# Patient Record
Sex: FEMALE | URBAN - METROPOLITAN AREA
[De-identification: names, ages, dates, MRNs, and addresses within clinical notes are randomized per-mention and may not be internally consistent; named-entity substitution may affect disease eponyms.]

---

## 2021-12-06 ENCOUNTER — IMMUNIZATION (OUTPATIENT)
Dept: NURSING | Facility: CLINIC | Age: 41
End: 2021-12-06
Payer: COMMERCIAL

## 2021-12-06 PROCEDURE — 90471 IMMUNIZATION ADMIN: CPT

## 2021-12-06 PROCEDURE — 90686 IIV4 VACC NO PRSV 0.5 ML IM: CPT

## 2021-12-06 PROCEDURE — 91306 COVID-19,PF,MODERNA (18+ YRS BOOSTER .25ML): CPT

## 2021-12-06 PROCEDURE — 0064A COVID-19,PF,MODERNA (18+ YRS BOOSTER .25ML): CPT

## 2022-02-03 ENCOUNTER — LAB (OUTPATIENT)
Dept: LAB | Facility: CLINIC | Age: 42
End: 2022-02-03
Payer: COMMERCIAL

## 2022-02-03 ENCOUNTER — OFFICE VISIT (OUTPATIENT)
Dept: INTERNAL MEDICINE | Facility: CLINIC | Age: 42
End: 2022-02-03
Payer: COMMERCIAL

## 2022-02-03 VITALS
OXYGEN SATURATION: 99 % | SYSTOLIC BLOOD PRESSURE: 108 MMHG | DIASTOLIC BLOOD PRESSURE: 76 MMHG | HEIGHT: 62 IN | WEIGHT: 102 LBS | RESPIRATION RATE: 16 BRPM | BODY MASS INDEX: 18.77 KG/M2 | HEART RATE: 69 BPM

## 2022-02-03 DIAGNOSIS — Z00.00 ROUTINE HISTORY AND PHYSICAL EXAMINATION OF ADULT: ICD-10-CM

## 2022-02-03 DIAGNOSIS — H74.91 DISORDER OF RIGHT MIDDLE EAR: Primary | ICD-10-CM

## 2022-02-03 LAB
ERYTHROCYTE [DISTWIDTH] IN BLOOD BY AUTOMATED COUNT: 18.1 % (ref 10–15)
FERRITIN SERPL-MCNC: 4 NG/ML (ref 12–150)
HCT VFR BLD AUTO: 34.8 % (ref 35–47)
HGB BLD-MCNC: 10.8 G/DL (ref 11.7–15.7)
HIV 1+2 AB+HIV1 P24 AG SERPL QL IA: NONREACTIVE
IRON SATN MFR SERPL: 8 % (ref 15–46)
IRON SERPL-MCNC: 34 UG/DL (ref 35–180)
MCH RBC QN AUTO: 23.8 PG (ref 26.5–33)
MCHC RBC AUTO-ENTMCNC: 31 G/DL (ref 31.5–36.5)
MCV RBC AUTO: 77 FL (ref 78–100)
PLATELET # BLD AUTO: 404 10E3/UL (ref 150–450)
RBC # BLD AUTO: 4.53 10E6/UL (ref 3.8–5.2)
T PALLIDUM AB SER QL: NONREACTIVE
TIBC SERPL-MCNC: 422 UG/DL (ref 240–430)
TSH SERPL DL<=0.005 MIU/L-ACNC: 2.56 MU/L (ref 0.4–4)
WBC # BLD AUTO: 5.6 10E3/UL (ref 4–11)

## 2022-02-03 PROCEDURE — 99386 PREV VISIT NEW AGE 40-64: CPT | Mod: 25 | Performed by: STUDENT IN AN ORGANIZED HEALTH CARE EDUCATION/TRAINING PROGRAM

## 2022-02-03 PROCEDURE — 82728 ASSAY OF FERRITIN: CPT | Performed by: PATHOLOGY

## 2022-02-03 PROCEDURE — 90471 IMMUNIZATION ADMIN: CPT | Performed by: STUDENT IN AN ORGANIZED HEALTH CARE EDUCATION/TRAINING PROGRAM

## 2022-02-03 PROCEDURE — 90715 TDAP VACCINE 7 YRS/> IM: CPT | Performed by: STUDENT IN AN ORGANIZED HEALTH CARE EDUCATION/TRAINING PROGRAM

## 2022-02-03 PROCEDURE — 87491 CHLMYD TRACH DNA AMP PROBE: CPT | Mod: 90 | Performed by: PATHOLOGY

## 2022-02-03 PROCEDURE — 87591 N.GONORRHOEAE DNA AMP PROB: CPT | Mod: 90 | Performed by: PATHOLOGY

## 2022-02-03 PROCEDURE — 87389 HIV-1 AG W/HIV-1&-2 AB AG IA: CPT | Mod: 90 | Performed by: PATHOLOGY

## 2022-02-03 PROCEDURE — 86780 TREPONEMA PALLIDUM: CPT | Mod: 90 | Performed by: PATHOLOGY

## 2022-02-03 PROCEDURE — 85027 COMPLETE CBC AUTOMATED: CPT | Performed by: PATHOLOGY

## 2022-02-03 PROCEDURE — 36415 COLL VENOUS BLD VENIPUNCTURE: CPT | Performed by: PATHOLOGY

## 2022-02-03 PROCEDURE — 83550 IRON BINDING TEST: CPT | Performed by: PATHOLOGY

## 2022-02-03 PROCEDURE — 99000 SPECIMEN HANDLING OFFICE-LAB: CPT | Performed by: PATHOLOGY

## 2022-02-03 PROCEDURE — 84443 ASSAY THYROID STIM HORMONE: CPT | Performed by: PATHOLOGY

## 2022-02-03 RX ORDER — LORATADINE 10 MG/1
10 TABLET ORAL
COMMUNITY
Start: 2021-08-27

## 2022-02-03 RX ORDER — SERTRALINE HYDROCHLORIDE 100 MG/1
100 TABLET, FILM COATED ORAL
COMMUNITY
Start: 2021-08-27 | End: 2022-10-11

## 2022-02-03 ASSESSMENT — MIFFLIN-ST. JEOR: SCORE: 1076.95

## 2022-02-03 ASSESSMENT — PAIN SCALES - GENERAL: PAINLEVEL: NO PAIN (0)

## 2022-02-03 NOTE — TELEPHONE ENCOUNTER
FUTURE VISIT INFORMATION      FUTURE VISIT INFORMATION:    Date: 3/10/22    Time: 9AM    Location: [facility]  REFERRAL INFORMATION:    Referring provider:  Tiffanie Jewell MD    Referring providers clinic:  [facility]     Reason for visit/diagnosis  Whooshing sounds- Refered by Geeta Clayton MD in [facility]    RECORDS REQUESTED FROM:       Clinic name Comments Records Status Imaging Status     [facility]  2/3/22 note from Tiffanie Jewell MD   Epic

## 2022-02-03 NOTE — PROGRESS NOTES
"CC: est care      HPI:  41F with no significant PMH presents today to establish care.    Has had \"wooshing\" sounds in R ear for a while now. Had sinus surgery 11 years ago - L maxillary sinus debridement for sinus infection. Wants to see ENT. No hearing loss, tinnitus, ear problems/surgery in past, recent ear infections.    Works as professor. Healthy, no specific complaints. No health conditions run in family except for NILTON and thyroid disorders so wants to be checked for this.    ROS:  10pt ROS reviewed and positive per HPI.    RHM:  Vaccinations:   COVID-19:3 doses   Influenza: done   Tetanus (1 Tdap then td/tdap q10y): unsure when last due  Screening   Pap (21-65 yrs q3 or 5y): been less than 5 years at [place].   Sexually active - with long term partner, wants STI testing  Works as professor  Lifestyle: tobacco use: never. Alcohol use: 6/week. Recreational drug use: denies.  Family - maternal great aunt w/ ovarian. No breast cancer. No colon cancer.    No past medical history on file.  No past surgical history on file.  No family history on file.  Social History     Tobacco Use     Smoking status: Not on file     Smokeless tobacco: Not on file   Substance Use Topics     Alcohol use: Not on file     Drug use: Not on file     Current Outpatient Medications   Medication Sig Dispense Refill     loratadine (CLARITIN) 10 MG tablet Take 10 mg by mouth       sertraline (ZOLOFT) 100 MG tablet Take 100 mg by mouth          Allergies   Allergen Reactions     Pollen Extract          /76 (BP Location: Right arm, Patient Position: Sitting, Cuff Size: Adult Regular)   Pulse 69   Resp 16   Ht 1.568 m (5' 1.75\")   Wt 46.3 kg (102 lb)   SpO2 99%   BMI 18.81 kg/m    Physical Examination:    General:  Conversant, generally healthy appearing, no acute distress  Head: atraumatic  Ears:  TM's normal, EAC's patent, clear of cerumen  Neck:  Trachea midline, Full AROM, supple, thyroid smooth, symmetric, not enlarged, no " nodules, no neck lymphadenopathy  Lungs:  CTAB. Normal respiratory effort on room air.  C/V:  RRR with no m/r/g.    Neuro: Alert and oriented, face symmetric. Able to get on/off exam table without assistance.   Skin:   warm, dry, no rashes on exposed skin surfaces  Extremities:  No peripheral edema, no digital cyanosis  Psych:  Alert and oriented. Appropriate affect.        A&P:  RHM  -sti screening today  -tdap  -request records for last pap smear  -given family history check cbc, iron, tsh    Middle ear problem  Exam grossly normal but with persistent sound in ear. No hearing loss or tinnitus or ear infection.  -ENT referral      Destini Jewell MD  IM Resident PGY-2  Pager [phone]    This patient was discussed with Dr. Clayton.

## 2022-02-04 LAB
C TRACH DNA SPEC QL NAA+PROBE: NEGATIVE
N GONORRHOEA DNA SPEC QL NAA+PROBE: NEGATIVE

## 2022-02-17 DIAGNOSIS — Z01.10 ENCOUNTER FOR HEARING TEST: Primary | ICD-10-CM

## 2022-03-10 ENCOUNTER — PRE VISIT (OUTPATIENT)
Dept: OTOLARYNGOLOGY | Facility: CLINIC | Age: 42
End: 2022-03-10

## 2022-03-10 ENCOUNTER — OFFICE VISIT (OUTPATIENT)
Dept: OTOLARYNGOLOGY | Facility: CLINIC | Age: 42
End: 2022-03-10
Payer: COMMERCIAL

## 2022-03-10 ENCOUNTER — OFFICE VISIT (OUTPATIENT)
Dept: AUDIOLOGY | Facility: CLINIC | Age: 42
End: 2022-03-10
Payer: COMMERCIAL

## 2022-03-10 VITALS
OXYGEN SATURATION: 98 % | WEIGHT: 102 LBS | TEMPERATURE: 97.9 F | DIASTOLIC BLOOD PRESSURE: 45 MMHG | SYSTOLIC BLOOD PRESSURE: 78 MMHG | HEIGHT: 62 IN | HEART RATE: 75 BPM | BODY MASS INDEX: 18.77 KG/M2

## 2022-03-10 DIAGNOSIS — H93.A1 PULSATILE TINNITUS OF RIGHT EAR: ICD-10-CM

## 2022-03-10 DIAGNOSIS — H93.11 TINNITUS OF RIGHT EAR: Primary | ICD-10-CM

## 2022-03-10 DIAGNOSIS — Z01.10 ENCOUNTER FOR HEARING TEST: ICD-10-CM

## 2022-03-10 DIAGNOSIS — H93.11 TINNITUS, RIGHT: Primary | ICD-10-CM

## 2022-03-10 PROCEDURE — 99214 OFFICE O/P EST MOD 30 MIN: CPT | Performed by: REGISTERED NURSE

## 2022-03-10 PROCEDURE — 92550 TYMPANOMETRY & REFLEX THRESH: CPT | Performed by: AUDIOLOGIST

## 2022-03-10 PROCEDURE — 92557 COMPREHENSIVE HEARING TEST: CPT | Performed by: AUDIOLOGIST

## 2022-03-10 ASSESSMENT — PAIN SCALES - GENERAL: PAINLEVEL: NO PAIN (0)

## 2022-03-10 NOTE — PATIENT INSTRUCTIONS
- You were seen in the ENT Clinic today by Josee Ba NP.   - Please schedule MRI and MRA at your convenience. Clinic will call with results once available.  - Please send a [email or web address] message or call the ENT clinic at [phone] with any questions or concerns.

## 2022-03-10 NOTE — PROGRESS NOTES
AUDIOLOGY REPORT     SUMMARY: Audiology visit completed. See audiogram for results.       RECOMMENDATIONS: Follow-up with ENT.     Radha Zavaleta CCC-A  Licensed Audiologist   [place] #[id number]

## 2022-03-10 NOTE — NURSING NOTE
"Chief Complaint   Patient presents with     Consult     New patient       Blood pressure (!) 78/45, pulse 75, temperature 97.9  F (36.6  C), temperature source Temporal, height 1.575 m (5' 2\"), weight 46.3 kg (102 lb), SpO2 98 %.    Channing Johnston, EMT  "

## 2022-03-10 NOTE — PROGRESS NOTES
Otolaryngology Clinic  March 10, 2022    Chief Complaint:   Right tinnitus     History of Present Illness:   Olesya Hillman is a 41 year old female who presents today for evaluation of right-sided tinnitus.  Patient describes an acute onset beginning a couple months ago of unilateral symptoms of tinnitus and whooshing sound on the right side.  The symptoms are intermittent and can change throughout the day.  Patient describes that most consistently tinnitus is a pulsating lower tone.  Does not seem to change with position or turning of head but just occurs randomly throughout the day.  Typically does not last the whole day.  This is sometimes accompanied with aural fullness.  Patient denies any change in hearing.     Patient denies any otalgia, otorrhea, dizziness, facial numbness/weakness, blurred vision, history of frequent ear infections, or ear surgeries.  Denies history of head or noise trauma.  Grandparents with hearing loss in their late 70s.  Patient denies any TMJ symptoms, however, does report frequently chewing on ice.    Past Medical History:  No past medical history on file.    Past Surgical History:  No past surgical history on file.    Medications:  Current Outpatient Medications   Medication Sig Dispense Refill     loratadine (CLARITIN) 10 MG tablet Take 10 mg by mouth       sertraline (ZOLOFT) 100 MG tablet Take 100 mg by mouth         Allergies:  Allergies   Allergen Reactions     Pollen Extract         Social History:  Social History     Tobacco Use     Smoking status: Not on file     Smokeless tobacco: Not on file   Substance Use Topics     Alcohol use: Not on file     Drug use: Not on file       ROS: 10 point ROS neg other than the symptoms noted above in the HPI.    Physical Exam:    There were no vitals taken for this visit.     Constitutional:  The patient was unaccompanied, well-groomed, and in no acute distress.     Neurologic: Alert and oriented x 3.  CN's III-XII within normal  limits.  Voice normal.   Psychiatric: The patient's affect was calm, cooperative, and appropriate.     Communication:  Normal; communicates verbally, normal voice quality.    Respiratory: Breathing comfortably without stridor or exertion of accessory muscles.    Ears: Pinnae and tragus non-tender.  EAC's and TM's were clear.    Oral Cavity: No tenderness with palpation of temporomandibular joints.      Audiogram: 3/10/2022- data independently reviewed  Normal hearing bilaterallly    WR right: 96% left: 96% @ 60 dB  Acoustic Reflexes: Present in left ipsi and right contra. Absent in right ipsi and left contra.  Tympanograms: type A bilaterally            Assessment and Plan:  1.  Pulsatile tinnitus of right ear  Patient with 3-month history of intermittent right pulsatile tinnitus.  Reviewed audiogram with patient today that shows bilateral normal hearing.  Ear exam is normal on today's exam.  Patient is neurologically intact.  Due to unilateral symptoms, will order MRI and MRA to further evaluate.  If these are negative, I suspect symptoms are likely due to inflammation of the TMJ that could be exacerbated by ice chewing.  Recommend patient refrain from ice to see if this improves symptoms.    We will contact patient with results and follow-up plan of care once available.     Josee Ba DNP, APRN, CNP  Otolaryngology  Head & Neck Surgery  [phone]    30 minutes spent on the date of the encounter doing chart review, history and exam, documentation and further activities.

## 2022-03-10 NOTE — LETTER
3/10/2022       RE: Olesya Hillman  [address] [facility] [address]     Dear Colleague,    Thank you for referring your patient, Olesya Hillman, to the [facility] [place] [facility]. Please see a copy of my visit note below.      Otolaryngology Clinic  March 10, 2022    Chief Complaint:   Right tinnitus     History of Present Illness:   Olesya Hillman is a 41 year old female who presents today for evaluation of right-sided tinnitus.  Patient describes an acute onset beginning a couple months ago of unilateral symptoms of tinnitus and whooshing sound on the right side.  The symptoms are intermittent and can change throughout the day.  Patient describes that most consistently tinnitus is a pulsating lower tone.  Does not seem to change with position or turning of head but just occurs randomly throughout the day.  Typically does not last the whole day.  This is sometimes accompanied with aural fullness.  Patient denies any change in hearing.     Patient denies any otalgia, otorrhea, dizziness, facial numbness/weakness, blurred vision, history of frequent ear infections, or ear surgeries.  Denies history of head or noise trauma.  Grandparents with hearing loss in their late 70s.  Patient denies any TMJ symptoms, however, does report frequently chewing on ice.    Past Medical History:  No past medical history on file.    Past Surgical History:  No past surgical history on file.    Medications:  Current Outpatient Medications   Medication Sig Dispense Refill     loratadine (CLARITIN) 10 MG tablet Take 10 mg by mouth       sertraline (ZOLOFT) 100 MG tablet Take 100 mg by mouth         Allergies:  Allergies   Allergen Reactions     Pollen Extract         Social History:  Social History     Tobacco Use     Smoking status: Not on file     Smokeless tobacco: Not on file   Substance Use Topics     Alcohol use: Not on  file     Drug use: Not on file       ROS: 10 point ROS neg other than the symptoms noted above in the HPI.    Physical Exam:    There were no vitals taken for this visit.     Constitutional:  The patient was unaccompanied, well-groomed, and in no acute distress.     Neurologic: Alert and oriented x 3.  CN's III-XII within normal limits.  Voice normal.   Psychiatric: The patient's affect was calm, cooperative, and appropriate.     Communication:  Normal; communicates verbally, normal voice quality.    Respiratory: Breathing comfortably without stridor or exertion of accessory muscles.    Ears: Pinnae and tragus non-tender.  EAC's and TM's were clear.    Oral Cavity: No tenderness with palpation of temporomandibular joints.      Audiogram: 3/10/2022- data independently reviewed  Normal hearing bilaterallly    WR right: 96% left: 96% @ 60 dB  Acoustic Reflexes: Present in left ipsi and right contra. Absent in right ipsi and left contra.  Tympanograms: type A bilaterally            Assessment and Plan:  1.  Pulsatile tinnitus of right ear  Patient with 3-month history of intermittent right pulsatile tinnitus.  Reviewed audiogram with patient today that shows bilateral normal hearing.  Ear exam is normal on today's exam.  Patient is neurologically intact.  Due to unilateral symptoms, will order MRI and MRA to further evaluate.  If these are negative, I suspect symptoms are likely due to inflammation of the TMJ that could be exacerbated by ice chewing.  Recommend patient refrain from ice to see if this improves symptoms.    We will contact patient with results and follow-up plan of care once available.     Josee Ba DNP, APRN, CNP  Otolaryngology  Head & Neck Surgery  [phone]    30 minutes spent on the date of the encounter doing chart review, history and exam, documentation and further activities.      Again, thank you for allowing me to participate in the care of your patient.      Sincerely,    Celia PARDO  LALA Ba

## 2022-03-24 ENCOUNTER — ANCILLARY PROCEDURE (OUTPATIENT)
Dept: MRI IMAGING | Facility: CLINIC | Age: 42
End: 2022-03-24
Attending: REGISTERED NURSE
Payer: COMMERCIAL

## 2022-03-24 DIAGNOSIS — H93.A1 PULSATILE TINNITUS OF RIGHT EAR: ICD-10-CM

## 2022-03-24 DIAGNOSIS — H93.11 TINNITUS, RIGHT: ICD-10-CM

## 2022-03-24 PROCEDURE — 70553 MRI BRAIN STEM W/O & W/DYE: CPT | Mod: GC | Performed by: RADIOLOGY

## 2022-03-24 PROCEDURE — A9585 GADOBUTROL INJECTION: HCPCS | Performed by: RADIOLOGY

## 2022-03-24 PROCEDURE — 99207 MRA BRAIN (CIRCLE OF WILLIS) WO CONTRAST: CPT | Mod: GC | Performed by: RADIOLOGY

## 2022-03-24 RX ORDER — GADOBUTROL 604.72 MG/ML
7.5 INJECTION INTRAVENOUS ONCE
Status: COMPLETED | OUTPATIENT
Start: 2022-03-24 | End: 2022-03-24

## 2022-03-24 RX ADMIN — GADOBUTROL 5 ML: 604.72 INJECTION INTRAVENOUS at 17:54

## 2022-03-24 NOTE — DISCHARGE INSTRUCTIONS
MRI Contrast Discharge Instructions    The IV contrast you received today will pass out of your body in your  urine. This will happen in the next 24 hours. You will not feel this process.  Your urine will not change color.    Drink at least 4 extra glasses of water or juice today (unless your doctor  has restricted your fluids). This reduces the stress on your kidneys.  You may take your regular medicines.    If you are on dialysis: It is best to have dialysis today.    If you have a reaction: Most reactions happen right away. If you have  any new symptoms after leaving the hospital (such as hives or swelling),  call your hospital at the correct number below. Or call your family doctor.  If you have breathing distress or wheezing, call 911.    Special instructions: ***    I have read and understand the above information.    Signature:______________________________________ Date:___________    Staff:__________________________________________ Date:___________     Time:__________    [place] Radiology Departments:    ___[place]: [phone]  ___[facility]: [phone]  ___[place]: [phone] ___[facility]: [phone]  ___[facility]: [phone]  ___[facility]: [phone]  ___[phone]  ___[facility]: [phone]  ___[phone]

## 2022-03-25 ENCOUNTER — TELEPHONE (OUTPATIENT)
Dept: OTOLARYNGOLOGY | Facility: CLINIC | Age: 42
End: 2022-03-25
Payer: COMMERCIAL

## 2022-03-25 NOTE — TELEPHONE ENCOUNTER
"----- Message from Celia Ba NP sent at 3/25/2022  8:49 AM CDT -----  \"Can you please let patient know that MRI and MRA imaging did not show any concerning findings to be a cause for patient's symptoms. Both exams were normal. Patient should continue to monitor symptoms and follow TMJ management including refraining from chewing on hard/crunchy items such as ice. Follow up as needed.\" per Josee Ba    Pt given message, no further questions.    Mamta Shahid LPN      "

## 2022-07-25 ENCOUNTER — TELEPHONE (OUTPATIENT)
Dept: INTERNAL MEDICINE | Facility: CLINIC | Age: 42
End: 2022-07-25

## 2022-07-25 NOTE — TELEPHONE ENCOUNTER
[facility] Call Center    Phone Message    May a detailed message be left on voicemail: yes     Reason for Call: Other: Patient was referred on 02/03/2022 to ENT for right ear concerns. Patient saw Celia Ba NP on 3/10/2022 at ENT clinic. Patient called back to schedule with them since her ear is now totally clogged from COVID. Patient was told she needs a referral. Patient is confused since she was already referred to see ENT and saw them. Patient would prefer to see an MD instead of a NP. For a normal ENT appt cannot be seen until October. Wondering if referral can be marked ASAP.        Please call back to discuss.    Action Taken: Message routed to:  [facility] ([facility]    Travel Screening: Not Applicable

## 2022-07-26 NOTE — TELEPHONE ENCOUNTER
Reaching out to ENT clinic to clarify.    Turner Coyle CMA ([facility]) at 10:55 AM on 7/26/2022

## 2022-07-26 NOTE — TELEPHONE ENCOUNTER
HELEN with message from clinic    Patient was referred to ENT back in February. We used that referral to to see Josee. Patient cannot use that same referral to see another provider within clinic. [facility] also does not do in house second opinions for same diagnosis. Patient can see Josee again for a follow up or they can see another ENT clinic since they do not require a referral. Gave call center number

## 2022-08-01 ENCOUNTER — TELEPHONE (OUTPATIENT)
Dept: OBGYN | Facility: CLINIC | Age: 42
End: 2022-08-01

## 2022-08-01 NOTE — TELEPHONE ENCOUNTER
Spoke with patient and scheduled for appointment with Dr. Pinto for ear and cough issues post covid. Patient also scheduled annual/pap.    Nina Ny

## 2022-08-16 ENCOUNTER — OFFICE VISIT (OUTPATIENT)
Dept: FAMILY MEDICINE | Facility: CLINIC | Age: 42
End: 2022-08-16
Attending: FAMILY MEDICINE
Payer: COMMERCIAL

## 2022-08-16 VITALS
HEART RATE: 69 BPM | SYSTOLIC BLOOD PRESSURE: 109 MMHG | WEIGHT: 104 LBS | DIASTOLIC BLOOD PRESSURE: 72 MMHG | BODY MASS INDEX: 19.14 KG/M2 | HEIGHT: 62 IN

## 2022-08-16 DIAGNOSIS — Z86.16 HISTORY OF COVID-19: Primary | ICD-10-CM

## 2022-08-16 DIAGNOSIS — D50.8 IRON DEFICIENCY ANEMIA SECONDARY TO INADEQUATE DIETARY IRON INTAKE: ICD-10-CM

## 2022-08-16 DIAGNOSIS — M26.609 TMJ (TEMPOROMANDIBULAR JOINT SYNDROME): ICD-10-CM

## 2022-08-16 DIAGNOSIS — H93.8X1 EAR FULLNESS, RIGHT: ICD-10-CM

## 2022-08-16 PROCEDURE — 99203 OFFICE O/P NEW LOW 30 MIN: CPT | Performed by: FAMILY MEDICINE

## 2022-08-16 ASSESSMENT — ANXIETY QUESTIONNAIRES
5. BEING SO RESTLESS THAT IT IS HARD TO SIT STILL: NOT AT ALL
IF YOU CHECKED OFF ANY PROBLEMS ON THIS QUESTIONNAIRE, HOW DIFFICULT HAVE THESE PROBLEMS MADE IT FOR YOU TO DO YOUR WORK, TAKE CARE OF THINGS AT HOME, OR GET ALONG WITH OTHER PEOPLE: NOT DIFFICULT AT ALL
GAD7 TOTAL SCORE: 2
7. FEELING AFRAID AS IF SOMETHING AWFUL MIGHT HAPPEN: NOT AT ALL
3. WORRYING TOO MUCH ABOUT DIFFERENT THINGS: NOT AT ALL
1. FEELING NERVOUS, ANXIOUS, OR ON EDGE: SEVERAL DAYS
2. NOT BEING ABLE TO STOP OR CONTROL WORRYING: SEVERAL DAYS
6. BECOMING EASILY ANNOYED OR IRRITABLE: NOT AT ALL
GAD7 TOTAL SCORE: 2

## 2022-08-16 ASSESSMENT — PAIN SCALES - GENERAL: PAINLEVEL: NO PAIN (0)

## 2022-08-16 ASSESSMENT — PATIENT HEALTH QUESTIONNAIRE - PHQ9
SUM OF ALL RESPONSES TO PHQ QUESTIONS 1-9: 3
5. POOR APPETITE OR OVEREATING: NOT AT ALL

## 2022-08-16 NOTE — PATIENT INSTRUCTIONS
I would recommend starting ferrous sulfate 325 mg (available over the counter) every other day (iron should especially be taken separately from calcium-containing foods and beverages (milk), calcium supplements, cereals, dietary fiber, tea, coffee, and eggs). Side effects may include metallic taste, nausea, flatulence, constipation, diarrhea. Let me know if you have any questions or concerns.        Recheck iron levels in 4-8 weeks - lab orders are in

## 2022-08-16 NOTE — PROGRESS NOTES
CC: Covid Concern (Long symptoms )      ASSESSMENT/PLAN:   Olesya was seen today for covid concern.    History of COVID-19  Right ear fullness  TMJ (temporomandibular joint syndrome)  Ear fullness likely multifactorial, related to Covid-19 as she had sx after both times she had Covid, but also some TMJ symptoms as well. Recommend est care w/ dentist for TMJ evaluation.    Iron deficiency anemia secondary to inadequate dietary iron intake  Reviewed labs from 2/2022 which showed iron deficiency anemia, she was not aware of these results. Does not have heavy menses and no obvious GI source of blood loss, does admit that her diet could be more iron-rich. Will start iron supplement, risks/benefits/side effects reviewed. Recheck labs in 1-2 months.   -     Ferritin; Future  -     CBC with platelets; Future      Worrisome signs and symptoms were discussed with Olesya and she was instructed to return to the clinic for concerning symptoms or to call with questions.  Return in about 2 months (around 10/16/2022).    Kate Pinto MD  Family Medicine      SUBJECTIVE: Olesya Hillman is a 42 year old female who comes in with the following concerns:    Tested positive for Covid-19 at the end of December 2021 for the first time. A month or so later, noticed fullness in her right ear. Sleeps with ear plugs and could notice a whooshing noise. Went and saw ENT and had audiology evaluation, and everything came back normal, and symptoms dissipated. MRA brain normal. Symptoms stopped in May.     Tested positive for Covid-19 again in mid-July, and symptoms came back. Fullness in right ear. Not all the time. Sometimes notices a clicking sound when she chews. Hearing is the same and not diminished. Whooshing, fullness, clicking in her jaw with movement. No drainage from the ear. No sore throat.    Covid-19 symptoms included nasal congestion.     Unrelated, she chews a lot of ice over the last 5 years or so. She has had iron deficiency  "screening in the past and she thinks it was normal.      PMH: No medical problems.   PSH: Left maxillary sinus surgery about 10 years ago.   SH: Nonsmoker.      Menses - 3 days of bleeding, never super heavy. Every 27 days.   Normal healthy diet but doesn't cook meat.    Iron deficiency runs in her family.    She had an anal fissure a few years ago, resolved in 2021. Now has normal BMs, no constipation, no blood in stool, no black stools.     She hasn't seen the dentist in >1 yr.       OBJECTIVE:   /72   Pulse 69   Ht 1.562 m (5' 1.5\")   Wt 47.2 kg (104 lb)   BMI 19.33 kg/m    General: Alert and oriented in no acute distress.  Skin: Clear without lesions or rashes.   Lymph: No anterior cervical lymphadenopathy.   Eyes: PERRL. EOMI.   ENT: TMs intact and pearly gray. Oropharynx moist without lesions or exudate. Supple neck.  Psych: Mood and affect appropriate.      Component      Latest Ref Rng & Units 2/3/2022   WBC      4.0 - 11.0 10e3/uL 5.6   RBC Count      3.80 - 5.20 10e6/uL 4.53   Hemoglobin      11.7 - 15.7 g/dL 10.8 (L)   Hematocrit      35.0 - 47.0 % 34.8 (L)   MCV      78 - 100 fL 77 (L)   MCH      26.5 - 33.0 pg 23.8 (L)   MCHC      31.5 - 36.5 g/dL 31.0 (L)   RDW      10.0 - 15.0 % 18.1 (H)   Platelet Count      150 - 450 10e3/uL 404   Iron      35 - 180 ug/dL 34 (L)   Iron Binding Cap      240 - 430 ug/dL 422   Iron Saturation Index      15 - 46 % 8 (L)   Ferritin      12 - 150 ng/mL 4 (L)     "

## 2022-09-07 ENCOUNTER — MYC MEDICAL ADVICE (OUTPATIENT)
Dept: FAMILY MEDICINE | Facility: CLINIC | Age: 42
End: 2022-09-07

## 2022-09-07 DIAGNOSIS — Z86.16 HISTORY OF COVID-19: Primary | ICD-10-CM

## 2022-09-07 DIAGNOSIS — H93.8X1 EAR FULLNESS, RIGHT: ICD-10-CM

## 2022-09-13 ENCOUNTER — OFFICE VISIT (OUTPATIENT)
Dept: OBGYN | Facility: CLINIC | Age: 42
End: 2022-09-13
Payer: COMMERCIAL

## 2022-09-13 VITALS
DIASTOLIC BLOOD PRESSURE: 79 MMHG | BODY MASS INDEX: 19.05 KG/M2 | HEART RATE: 71 BPM | HEIGHT: 62 IN | SYSTOLIC BLOOD PRESSURE: 123 MMHG | WEIGHT: 103.5 LBS

## 2022-09-13 DIAGNOSIS — Z12.4 CERVICAL CANCER SCREENING: ICD-10-CM

## 2022-09-13 DIAGNOSIS — Z00.00 ANNUAL PHYSICAL EXAM: Primary | ICD-10-CM

## 2022-09-13 DIAGNOSIS — F41.1 GAD (GENERALIZED ANXIETY DISORDER): ICD-10-CM

## 2022-09-13 PROCEDURE — G0463 HOSPITAL OUTPT CLINIC VISIT: HCPCS | Mod: 25

## 2022-09-13 PROCEDURE — G0145 SCR C/V CYTO,THINLAYER,RESCR: HCPCS | Performed by: OBSTETRICS & GYNECOLOGY

## 2022-09-13 PROCEDURE — 99386 PREV VISIT NEW AGE 40-64: CPT | Mod: GE | Performed by: OBSTETRICS & GYNECOLOGY

## 2022-09-13 PROCEDURE — 87624 HPV HI-RISK TYP POOLED RSLT: CPT | Performed by: OBSTETRICS & GYNECOLOGY

## 2022-09-13 RX ORDER — SERTRALINE HYDROCHLORIDE 100 MG/1
100 TABLET, FILM COATED ORAL DAILY
Qty: 30 TABLET | Refills: 0 | Status: SHIPPED | OUTPATIENT
Start: 2022-09-13 | End: 2022-10-11

## 2022-09-13 NOTE — PROGRESS NOTES
[facility] Annual Exam Note    CC: Annual exam    HPI: 42 year old G0 who presents to clinic for an annual exam. She is overall doing well, no concerns today. She has a history of pelvic pain, vulvodynia. She had a vestibulectomy in , with resolution of her symptoms. She has regular monthly periods, no heavy bleeding or cramping. Using condoms for contraception. She from cervix a few years ago, has 17 eggs frozen at [facility]. No abdominal pain, CP, SOB, dizziness, lightheadedness, fevers, chills, urinary symptoms, bowel symptoms.    OBHx:   OB History    Para Term  AB Living   0 0 0 0 0 0   SAB IAB Ectopic Multiple Live Births   0 0 0 0 0       GynHx:   Patient's last menstrual period was 2022.  Menses- monthly, 3d, no cramping  Last pap-patient is unsure thinks it has been a  Abnormal paps-  STIs- none  Contraception Hx: on pills in the past, now using condoms    PMH-No past medical history on file.    PSH-  Past Surgical History:   Procedure Laterality Date     SINUS SURGERY Left     left maxillary sinus       Social Hx:   Behavioral history: No tobacco use  Home environment: No secondhand tobacco smoke in home.  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  No  Breast Cancer?  No  Ovarian Cancer? Maternal aunt  Osteoporosis?  No    Hypertension?  father  Coronary Artery Disease?  No  DVT? mother  Diabetes?  mother      Medications:    Current Outpatient Medications:      sertraline (ZOLOFT) 100 MG tablet, Take 1 tablet (100 mg) by mouth daily, Disp: 30 tablet, Rfl: 0     loratadine (CLARITIN) 10 MG tablet, Take 10 mg by mouth, Disp: , Rfl:      sertraline (ZOLOFT) 100 MG tablet, Take 100 mg by mouth, Disp: , Rfl:       Allergies:     Allergies  "  Allergen Reactions     Pollen Extract          ROS:   Constitutional: no fevers, night sweats or unintentional weight change   Eyes: no vision change, diplopia or red eyes   Ears, Nose, Mouth, Throat: no tinnitus or hearing change, no epistaxis or nasal discharge, no oral lesions, throat clear   Cardiovascular: no chest pain, palpitations, or pain with walking, no orthopnea or PND   Breast: no swelling, masses, skin changes, or nipple discharge.   Respiratory: no dyspnea, cough, shortness of breath or wheezing   GI: no nausea, vomiting, diarrhea or constipation, no abdominal pain   : no incontinence, no dysuria or hematuria, no sexual dysfunction   Musculoskeletal: no joint or muscle pain or swelling   Integumentary: no concerning lesions or moles, abnormal or unwanted hair growth   Neuro: no loss of strength or sensation, no numbness or tingling, no tremor, no dizziness, no headache   Endo: no polyuria or polydipsia, no temperature intolerance   Heme/Lymph: no concerning bumps, no bleeding problems   Allergy: no environmental allergies   Psych: no depression or anxiety, no sleep problems.      O: /79   Pulse 71   Ht 1.562 m (5' 1.5\")   Wt 46.9 kg (103 lb 8 oz)   LMP 08/23/2022   BMI 19.24 kg/m     Gen: alert and oriented, sitting on exam table in NAD  CV: rrr, nl s1 and s2, no m/r/g  Lungs: CTAB, no wheezes or crackles  Abdomen: soft, non-tender, non-distended, no masses appreciated   Breasts: symmetric bilaterally with no skin retractions or dimpling; no masses or enlarged fixed nodes appreciated on palpation, no nipple discharge  : External genitalia, [place]th[place]'s and [place]'s glands and urethra normal-appearing with no areas of hypopigmentation or raised area. No obvious excoriations, lesions, or rashes. No evidence of dry skin or erythema.  Normal pink vaginal mucosa.  Cervix pink, smooth with physiologic-appearing discharge.  Bimanual: Uterus anteverted and normal size. No adnexal masses or " tenderness appreciated.     Assessment/Plan: 42 year old  who presents for an annual visit.     # Routine health maintenance   - Pap: collected today. No history of abnormal paps  - Mammography: discussed when to start screening and screening intervals. She is interested in starting screening now.   - Colonoscopy: not yet due  - Dexa: not yet due  - Vaccinations: up to date  - Discussed healthy lifestyle  - contraception: condoms, not interested in other methods    MyChart with results    RTC in one year or sooner as needed     Staffed with Dr. Shad Bejarano MD  OB/GYN PGY-3  2022 4:51 PM      The patient was seen in resident continuity clinic by Dr. Bejarano.  I have reviewed the history and exam, the assessment and plan were jointly made.     Idalia Kulkarni MD, FACOG

## 2022-09-15 LAB
BKR LAB AP GYN ADEQUACY: NORMAL
BKR LAB AP GYN INTERPRETATION: NORMAL
BKR LAB AP HPV REFLEX: NORMAL
BKR LAB AP LMP: NORMAL
BKR LAB AP PREVIOUS ABNORMAL: NORMAL
PATH REPORT.COMMENTS IMP SPEC: NORMAL
PATH REPORT.COMMENTS IMP SPEC: NORMAL
PATH REPORT.RELEVANT HX SPEC: NORMAL

## 2022-09-16 LAB
HUMAN PAPILLOMA VIRUS 16 DNA: NEGATIVE
HUMAN PAPILLOMA VIRUS 18 DNA: NEGATIVE
HUMAN PAPILLOMA VIRUS FINAL DIAGNOSIS: NORMAL
HUMAN PAPILLOMA VIRUS OTHER HR: NEGATIVE

## 2022-10-03 ENCOUNTER — HEALTH MAINTENANCE LETTER (OUTPATIENT)
Age: 42
End: 2022-10-03

## 2022-10-11 ENCOUNTER — LAB (OUTPATIENT)
Dept: LAB | Facility: CLINIC | Age: 42
End: 2022-10-11
Attending: FAMILY MEDICINE
Payer: COMMERCIAL

## 2022-10-11 ENCOUNTER — OFFICE VISIT (OUTPATIENT)
Dept: FAMILY MEDICINE | Facility: CLINIC | Age: 42
End: 2022-10-11
Attending: FAMILY MEDICINE
Payer: COMMERCIAL

## 2022-10-11 VITALS
WEIGHT: 102.2 LBS | HEART RATE: 81 BPM | BODY MASS INDEX: 18.81 KG/M2 | SYSTOLIC BLOOD PRESSURE: 103 MMHG | HEIGHT: 62 IN | DIASTOLIC BLOOD PRESSURE: 70 MMHG

## 2022-10-11 DIAGNOSIS — F41.1 GAD (GENERALIZED ANXIETY DISORDER): ICD-10-CM

## 2022-10-11 DIAGNOSIS — D50.8 IRON DEFICIENCY ANEMIA SECONDARY TO INADEQUATE DIETARY IRON INTAKE: Primary | ICD-10-CM

## 2022-10-11 DIAGNOSIS — D50.8 IRON DEFICIENCY ANEMIA SECONDARY TO INADEQUATE DIETARY IRON INTAKE: ICD-10-CM

## 2022-10-11 DIAGNOSIS — R09.81 NASAL CONGESTION: ICD-10-CM

## 2022-10-11 LAB
ERYTHROCYTE [DISTWIDTH] IN BLOOD BY AUTOMATED COUNT: 16 % (ref 10–15)
FERRITIN SERPL-MCNC: 5 NG/ML (ref 12–150)
HCT VFR BLD AUTO: 34 % (ref 35–47)
HGB BLD-MCNC: 11.3 G/DL (ref 11.7–15.7)
MCH RBC QN AUTO: 25.9 PG (ref 26.5–33)
MCHC RBC AUTO-ENTMCNC: 33.2 G/DL (ref 31.5–36.5)
MCV RBC AUTO: 78 FL (ref 78–100)
PLATELET # BLD AUTO: 401 10E3/UL (ref 150–450)
RBC # BLD AUTO: 4.36 10E6/UL (ref 3.8–5.2)
WBC # BLD AUTO: 7 10E3/UL (ref 4–11)

## 2022-10-11 PROCEDURE — 99214 OFFICE O/P EST MOD 30 MIN: CPT | Performed by: FAMILY MEDICINE

## 2022-10-11 PROCEDURE — G0463 HOSPITAL OUTPT CLINIC VISIT: HCPCS

## 2022-10-11 PROCEDURE — 85027 COMPLETE CBC AUTOMATED: CPT

## 2022-10-11 PROCEDURE — 82728 ASSAY OF FERRITIN: CPT

## 2022-10-11 PROCEDURE — 36415 COLL VENOUS BLD VENIPUNCTURE: CPT

## 2022-10-11 RX ORDER — SERTRALINE HYDROCHLORIDE 100 MG/1
100 TABLET, FILM COATED ORAL DAILY
Qty: 90 TABLET | Refills: 4 | Status: SHIPPED | OUTPATIENT
Start: 2022-10-11 | End: 2023-10-06

## 2022-10-11 NOTE — PROGRESS NOTES
CC: Follow Up      ASSESSMENT/PLAN:   Olesya was seen today for follow up.    Iron deficiency anemia secondary to inadequate dietary iron intake  Has been working on increasing dietary iron intake, but did not start iron supplement. Will recheck labs today and then determine if she still needs iron supplementation or can continue with an iron-rich diet.     ROSLYN (generalized anxiety disorder)  The current medical regimen is effective;  continue present plan and medications.  -     sertraline (ZOLOFT) 100 MG tablet; Take 1 tablet (100 mg) by mouth daily    Nasal congestion  Since Covid in July 2022. Trial of 3 days of Afrin, then 3-4 weeks of Flonase nasal spray. Continue loratadine 10 mg daily. If not improving, could also try Nettipot nasal rinses.       Worrisome signs and symptoms were discussed with Olesya and she was instructed to return to the clinic for concerning symptoms or to call with questions.    Kate Pinto MD  Family Medicine      SUBJECTIVE: Olesya is a 42 year old female with ROSLYN, iron deficiency anemia who comes in with the following concerns:    Iron deficiency anemia. Has been eating a lot more beef and dark greens, iron rich foods. Has not been taking oral iron supplement. Still craving ice. Menstrual cycles, not very heavy - they only last 3 days.      Anxiety. Long-standing diagnosis. Has been treating with sertraline 100 mg daily for the last 6 years. No adverse medication effects and medication is working well. Requests a refill today.     History of Covid in July 2022. She was having eustachian tube dysfunction after Covid, but her ear fullness went away, sypmtoms resolved after a flight to [place]. But she has still noticed significant nasal congestion. Can't properly breathe out of her nose since July 2022.   Did try Flonase nasal spray and Sudafed, neither one helped. But only took Flonase for a few days. Not any specific nostril that she can notice. She has no rhinorrhea or post nasal  "drainage or cough. H/o sinus surgery - left maxillary sinus. No sinsu headaches or issues though. She is taking loratadine 10 mg daily.       OBJECTIVE:   /70   Pulse 81   Ht 1.562 m (5' 1.5\")   Wt 46.4 kg (102 lb 3.2 oz)   LMP 08/23/2022   BMI 19.00 kg/m    General: Alert and oriented in no acute distress.  Skin: Clear without lesions or rashes.   Lymph: No anterior cervical lymphadenopathy.   Eyes: PERRL. EOMI.   ENT: TMs intact and pearly gray. Oropharynx moist without lesions or exudate. Rhinorrhea noted in both nares. Supple neck.     Component      Latest Ref Rng & Units 2/3/2022   WBC      4.0 - 11.0 10e3/uL 5.6   RBC Count      3.80 - 5.20 10e6/uL 4.53   Hemoglobin      11.7 - 15.7 g/dL 10.8 (L)   Hematocrit      35.0 - 47.0 % 34.8 (L)   MCV      78 - 100 fL 77 (L)   MCH      26.5 - 33.0 pg 23.8 (L)   MCHC      31.5 - 36.5 g/dL 31.0 (L)   RDW      10.0 - 15.0 % 18.1 (H)   Platelet Count      150 - 450 10e3/uL 404   Iron      35 - 180 ug/dL 34 (L)   Iron Binding Cap      240 - 430 ug/dL 422   Iron Saturation Index      15 - 46 % 8 (L)   Ferritin      12 - 150 ng/mL 4 (L)       "

## 2023-01-10 ENCOUNTER — OFFICE VISIT (OUTPATIENT)
Dept: FAMILY MEDICINE | Facility: CLINIC | Age: 43
End: 2023-01-10
Attending: FAMILY MEDICINE
Payer: COMMERCIAL

## 2023-01-10 VITALS
HEIGHT: 62 IN | DIASTOLIC BLOOD PRESSURE: 69 MMHG | HEART RATE: 93 BPM | SYSTOLIC BLOOD PRESSURE: 105 MMHG | BODY MASS INDEX: 18.29 KG/M2 | WEIGHT: 99.4 LBS

## 2023-01-10 DIAGNOSIS — H66.91 INFECTIVE RIGHT OTITIS MEDIA: Primary | ICD-10-CM

## 2023-01-10 PROCEDURE — 99213 OFFICE O/P EST LOW 20 MIN: CPT | Performed by: FAMILY MEDICINE

## 2023-01-10 PROCEDURE — G0463 HOSPITAL OUTPT CLINIC VISIT: HCPCS | Performed by: FAMILY MEDICINE

## 2023-01-10 ASSESSMENT — ANXIETY QUESTIONNAIRES
1. FEELING NERVOUS, ANXIOUS, OR ON EDGE: NOT AT ALL
5. BEING SO RESTLESS THAT IT IS HARD TO SIT STILL: NOT AT ALL
3. WORRYING TOO MUCH ABOUT DIFFERENT THINGS: NOT AT ALL
GAD7 TOTAL SCORE: 0
2. NOT BEING ABLE TO STOP OR CONTROL WORRYING: NOT AT ALL
7. FEELING AFRAID AS IF SOMETHING AWFUL MIGHT HAPPEN: NOT AT ALL
6. BECOMING EASILY ANNOYED OR IRRITABLE: NOT AT ALL
GAD7 TOTAL SCORE: 0

## 2023-01-10 ASSESSMENT — PATIENT HEALTH QUESTIONNAIRE - PHQ9
SUM OF ALL RESPONSES TO PHQ QUESTIONS 1-9: 2
5. POOR APPETITE OR OVEREATING: NOT AT ALL

## 2023-01-10 NOTE — PROGRESS NOTES
"CC: Sinus Problem      SUBJECTIVE: Olesya Hillman is a 42 year old female patient coming in with symptoms of nasal congestion, sinus pressure, R ear pain for 4 day(s).   Flew back from [facility] on Friday. On the descent into [facility], started to have R ear pain. By the next day, she had a sore throat, nasal congestion, and R ear feels completely blocked. Has been using Nettipot.Did a Nettipot this morning - a lot of yellow gunk. Has taken Sudafed and Flonase nasal spray and Claritin for symptoms. No improvement with treatments tried.     Took 2 Covid tests and both were negative.   No drainage from the ear.     Last night felt chills but no fever.     No eye symptoms. No cough. No SOB. No CP, nausea, vomiting, abdominal pain.     OBJECTIVE:   /69   Pulse 93   Ht 1.562 m (5' 1.5\")   Wt 45.1 kg (99 lb 6.4 oz)   BMI 18.48 kg/m     GEN: healthy, alert and no distress.   EARS: Rt TM: erythematous. Lt TM: normal  NOSE/SINUS: Nares normal. Septum midline. Mucosa normal. No drainage or sinus tenderness.  THROAT: normal   NECK:Neck supple. No adenopathy. Thyroid symmetric, normal size,   CHEST: Clear to auscultation  CV: RRR    ASSESSMENT/PLAN:   Olesya was seen today for sinus problem.    Diagnoses and all orders for this visit:    Infective right otitis media  -     amoxicillin-clavulanate (AUGMENTIN) 875-125 MG tablet; Take 1 tablet by mouth 2 times daily for 7 days    Discussed likely viral symptoms and recommend time for improvement, will send in a script for Augmentin that she will  if sx do not improve w/in 7-10 days. I discussed with the patient the risks, benefits, and alternatives to taking this medication as well as common side effects.   In addition, I have suggested that the patient continue to use Nettipot, can try 3 days of Afrin nasal spray.  Worrisome signs and symptoms were discussed with Olesya and she was instructed to return to the clinic for concerning symptoms or to call with " questions.  Follow-up if sx worsen or fail to improve as discussed.    Kate Pinto MD

## 2023-01-13 ENCOUNTER — MYC MEDICAL ADVICE (OUTPATIENT)
Dept: FAMILY MEDICINE | Facility: CLINIC | Age: 43
End: 2023-01-13

## 2023-04-18 ENCOUNTER — OFFICE VISIT (OUTPATIENT)
Dept: FAMILY MEDICINE | Facility: CLINIC | Age: 43
End: 2023-04-18
Payer: COMMERCIAL

## 2023-04-18 VITALS
DIASTOLIC BLOOD PRESSURE: 81 MMHG | OXYGEN SATURATION: 99 % | SYSTOLIC BLOOD PRESSURE: 112 MMHG | WEIGHT: 99 LBS | TEMPERATURE: 97.9 F | BODY MASS INDEX: 18.4 KG/M2 | RESPIRATION RATE: 15 BRPM | HEART RATE: 75 BPM

## 2023-04-18 DIAGNOSIS — J02.9 SORE THROAT: Primary | ICD-10-CM

## 2023-04-18 LAB
DEPRECATED S PYO AG THROAT QL EIA: NEGATIVE
GROUP A STREP BY PCR: NOT DETECTED

## 2023-04-18 PROCEDURE — U0003 INFECTIOUS AGENT DETECTION BY NUCLEIC ACID (DNA OR RNA); SEVERE ACUTE RESPIRATORY SYNDROME CORONAVIRUS 2 (SARS-COV-2) (CORONAVIRUS DISEASE [COVID-19]), AMPLIFIED PROBE TECHNIQUE, MAKING USE OF HIGH THROUGHPUT TECHNOLOGIES AS DESCRIBED BY CMS-2020-01-R: HCPCS | Performed by: FAMILY MEDICINE

## 2023-04-18 PROCEDURE — 87651 STREP A DNA AMP PROBE: CPT | Performed by: FAMILY MEDICINE

## 2023-04-18 NOTE — NURSING NOTE
42 year old  Chief Complaint   Patient presents with     Nasal Congestion     Congestion, runny nose, sore throat, headache x 5 days, COVID home test 3 days ago negative        Blood pressure 112/81, pulse 75, temperature 97.9  F (36.6  C), temperature source Skin, resp. rate 15, weight 44.9 kg (99 lb), last menstrual period 03/28/2023, SpO2 99 %. Body mass index is 18.4 kg/m .  Patient Active Problem List   Diagnosis     Iron deficiency anemia secondary to inadequate dietary iron intake       Wt Readings from Last 2 Encounters:   04/18/23 44.9 kg (99 lb)   01/10/23 45.1 kg (99 lb 6.4 oz)     BP Readings from Last 3 Encounters:   04/18/23 112/81   01/10/23 105/69   10/11/22 103/70         Current Outpatient Medications   Medication     loratadine (CLARITIN) 10 MG tablet     sertraline (ZOLOFT) 100 MG tablet     No current facility-administered medications for this visit.       Social History     Tobacco Use     Smoking status: Never     Smokeless tobacco: Never   Substance Use Topics     Alcohol use: Yes     Alcohol/week: 5.0 standard drinks of alcohol     Types: 5 Glasses of wine per week     Drug use: Never       Health Maintenance Due   Topic Date Due     ADVANCE CARE PLANNING  Never done     HEPATITIS B IMMUNIZATION (1 of 3 - 3-dose series) Never done     HEPATITIS C SCREENING  Never done     COVID-19 Vaccine (2 - Pfizer series) 11/28/2022       No results found for: PAP      April 18, 2023 11:40 AM

## 2023-04-18 NOTE — PROGRESS NOTES
Assessment & Plan   Problem List Items Addressed This Visit    None  Visit Diagnoses     Sore throat    -  Primary    Relevant Orders    Streptococcus A Rapid Screen w/Reflex to PCR - Clinic Collect (Completed)    Symptomatic COVID-19 Virus (Coronavirus) by PCR Nasopharyngeal    Group A Streptococcus PCR Throat Swab         Rapid strep negative. Will notify patient. Viral testing still pending. Will notify patient as results become available. I recognize there is still the possibility that this is a bacterial sinus infection. Advised patient if her symptoms should declare more forcefully over the next few days.     23 minutes spent on the date of the encounter doing chart review, history and exam, documentation and further activities as noted.    Tray Avalos MD  [facility]    Mickey Dacosta is a 42 year old, presenting for the following health issues:  Nasal Congestion (Congestion, runny nose, sore throat, headache x 5 days, COVID home test 3 days ago negative )    HPI   URI symptoms  - for the past 5 days  - head congestion, drainage, sore throat  - no cough  - no fever, chills, nausea  - was supposed to fly to [place] for a conference but doesn't want to do this if she is putting people at unnecessary risk  - would like to know if this is something she can treat with ABX or if she just needs to let it run its course      Review of Systems   Constitutional, HEENT, cardiovascular, pulmonary, gi and gu systems are negative, except as otherwise noted.      Objective    /81 (BP Location: Right arm, Patient Position: Sitting, Cuff Size: Adult Small)   Pulse 75   Temp 97.9  F (36.6  C) (Skin)   Resp 15   Wt 44.9 kg (99 lb)   LMP 03/28/2023 (Approximate)   SpO2 99%   BMI 18.40 kg/m    Body mass index is 18.4 kg/m .  Physical Exam   GENERAL: healthy, alert and no distress  HENT: ear canals and TM's normal, nose and mouth without ulcers or lesions  NECK: bilateral neck lymphadenopathy without  significant tenderness  RESP: lungs clear to auscultation - no rales, rhonchi or wheezes  CV: regular rate and rhythm, normal S1 S2, no S3 or S4, no murmur, click or rub, no peripheral edema and peripheral pulses strong  ABDOMEN: soft, nontender, no hepatosplenomegaly, no masses and bowel sounds normal  MS: no gross musculoskeletal defects noted, no edema

## 2023-04-19 LAB — SARS-COV-2 RNA RESP QL NAA+PROBE: NEGATIVE

## 2023-06-26 ENCOUNTER — ANCILLARY PROCEDURE (OUTPATIENT)
Dept: MAMMOGRAPHY | Facility: CLINIC | Age: 43
End: 2023-06-26
Attending: FAMILY MEDICINE
Payer: COMMERCIAL

## 2023-06-26 DIAGNOSIS — Z12.31 VISIT FOR SCREENING MAMMOGRAM: ICD-10-CM

## 2023-06-26 PROCEDURE — 77067 SCR MAMMO BI INCL CAD: CPT | Mod: 26 | Performed by: RADIOLOGY

## 2023-06-26 PROCEDURE — 77067 SCR MAMMO BI INCL CAD: CPT

## 2023-06-27 ENCOUNTER — MYC MEDICAL ADVICE (OUTPATIENT)
Dept: FAMILY MEDICINE | Facility: CLINIC | Age: 43
End: 2023-06-27
Payer: COMMERCIAL

## 2023-10-06 ENCOUNTER — LAB (OUTPATIENT)
Dept: LAB | Facility: CLINIC | Age: 43
End: 2023-10-06
Payer: COMMERCIAL

## 2023-10-06 ENCOUNTER — OFFICE VISIT (OUTPATIENT)
Dept: FAMILY MEDICINE | Facility: CLINIC | Age: 43
End: 2023-10-06
Payer: COMMERCIAL

## 2023-10-06 VITALS
BODY MASS INDEX: 18.96 KG/M2 | SYSTOLIC BLOOD PRESSURE: 122 MMHG | WEIGHT: 102 LBS | DIASTOLIC BLOOD PRESSURE: 70 MMHG | HEART RATE: 59 BPM

## 2023-10-06 DIAGNOSIS — D50.8 IRON DEFICIENCY ANEMIA SECONDARY TO INADEQUATE DIETARY IRON INTAKE: Primary | ICD-10-CM

## 2023-10-06 DIAGNOSIS — D50.8 IRON DEFICIENCY ANEMIA SECONDARY TO INADEQUATE DIETARY IRON INTAKE: ICD-10-CM

## 2023-10-06 DIAGNOSIS — Z86.16 HISTORY OF COVID-19: ICD-10-CM

## 2023-10-06 DIAGNOSIS — F41.1 GAD (GENERALIZED ANXIETY DISORDER): ICD-10-CM

## 2023-10-06 DIAGNOSIS — Z13.1 SCREENING FOR DIABETES MELLITUS: ICD-10-CM

## 2023-10-06 LAB
ERYTHROCYTE [DISTWIDTH] IN BLOOD BY AUTOMATED COUNT: 16 % (ref 10–15)
FERRITIN SERPL-MCNC: 8 NG/ML (ref 6–175)
HBA1C MFR BLD: 5.4 %
HCT VFR BLD AUTO: 35.3 % (ref 35–47)
HGB BLD-MCNC: 11.7 G/DL (ref 11.7–15.7)
MCH RBC QN AUTO: 27.3 PG (ref 26.5–33)
MCHC RBC AUTO-ENTMCNC: 33.1 G/DL (ref 31.5–36.5)
MCV RBC AUTO: 83 FL (ref 78–100)
PLATELET # BLD AUTO: 403 10E3/UL (ref 150–450)
RBC # BLD AUTO: 4.28 10E6/UL (ref 3.8–5.2)
TSH SERPL DL<=0.005 MIU/L-ACNC: 3.42 UIU/ML (ref 0.3–4.2)
WBC # BLD AUTO: 7.2 10E3/UL (ref 4–11)

## 2023-10-06 PROCEDURE — 82728 ASSAY OF FERRITIN: CPT

## 2023-10-06 PROCEDURE — 83036 HEMOGLOBIN GLYCOSYLATED A1C: CPT

## 2023-10-06 PROCEDURE — 99214 OFFICE O/P EST MOD 30 MIN: CPT | Performed by: FAMILY MEDICINE

## 2023-10-06 PROCEDURE — 36415 COLL VENOUS BLD VENIPUNCTURE: CPT

## 2023-10-06 PROCEDURE — 84443 ASSAY THYROID STIM HORMONE: CPT

## 2023-10-06 PROCEDURE — 85027 COMPLETE CBC AUTOMATED: CPT

## 2023-10-06 PROCEDURE — 90686 IIV4 VACC NO PRSV 0.5 ML IM: CPT

## 2023-10-06 PROCEDURE — 250N000011 HC RX IP 250 OP 636

## 2023-10-06 PROCEDURE — G0008 ADMIN INFLUENZA VIRUS VAC: HCPCS

## 2023-10-06 PROCEDURE — 99213 OFFICE O/P EST LOW 20 MIN: CPT | Performed by: FAMILY MEDICINE

## 2023-10-06 RX ORDER — FLUTICASONE PROPIONATE 50 MCG
1 SPRAY, SUSPENSION (ML) NASAL DAILY
COMMUNITY

## 2023-10-06 RX ORDER — SERTRALINE HYDROCHLORIDE 100 MG/1
100 TABLET, FILM COATED ORAL DAILY
Qty: 90 TABLET | Refills: 4 | Status: SHIPPED | OUTPATIENT
Start: 2023-10-06

## 2023-10-06 ASSESSMENT — PAIN SCALES - GENERAL: PAINLEVEL: NO PAIN (0)

## 2023-10-06 NOTE — PROGRESS NOTES
CC: RECHECK      SUBJECTIVE: Olesya is a 43 year old female who comes in with the following concerns:    Recent Covid-19 infection. This is her 3rd time having Covid-19. Tested positive for Covid on August 8. Covid symptoms were mild. She has had residual nasal congestion for which she is using Flonase nasal spray. Started it soon after she tested positive. Lingering fatigue still.     Iron deficiency anemia. Due to inadequate dietary iron intake. Not taking oral iron due to nausea.     Anxiety. Long-standing diagnosis. Has been treating with sertraline 100 mg daily for the last 5+ years. No adverse medication effects and medication is working well. Requests a refill today.        OBJECTIVE:   /70   Pulse 59   Wt 46.3 kg (102 lb)   LMP 09/28/2023 (Exact Date)   BMI 18.96 kg/m    General: Alert and oriented in no acute distress.  Skin: Clear without lesions or rashes.   Lymph: No anterior cervical lymphadenopathy.   Eyes: PERRL. EOMI.   ENT: TMs intact and pearly gray. Oropharynx moist without lesions or exudate. Supple neck.  Psych: Mood and affect appropriate.      ASSESSMENT/PLAN:   Olesya was seen today for recheck.    Iron deficiency anemia secondary to inadequate dietary iron intake  Struggles with oral iron due to nausea. Discussed IV iron infusion. She'll let me know next week.   -     CBC with Platelets; Future  -     Ferritin; Future    ROSLYN (generalized anxiety disorder)  The current medical regimen is effective;  continue present plan and medications.  -     sertraline (ZOLOFT) 100 MG tablet; Take 1 tablet (100 mg) by mouth daily    History of COVID-19  R/o thyroid disease contributing to fatigue but more likely d/t Covid-19. Discussed paced exercise, listening to body. Consider long Covid PT/OT if symptoms don't improve after 3-4 months.   -     TSH - Reflex to FT4; Future    Screening for diabetes mellitus  Due for routine DM screening. Mom was recently dx with DM.  -     Hemoglobin A1c;  Future    Worrisome signs and symptoms were discussed with Olesya and she was instructed to return to the clinic for concerning symptoms or to call with questions.       Kate Pinto MD  Family Medicine

## 2023-10-06 NOTE — PATIENT INSTRUCTIONS
Patient Education   Iron-Rich Diet: Care Instructions  Overview     Your body needs iron to make a protein called hemoglobin. This protein is found in red blood cells. It carries oxygen from the lungs to the rest of the cells in your body. If you don't get enough iron, your body makes fewer and smaller red blood cells. As a result, your body's cells may not get enough oxygen.  Most people can get the iron their bodies need by eating enough iron-rich foods. Your doctor may advise you to take an iron supplement along with eating an iron-rich diet.  Follow-up care is a key part of your treatment and safety. Be sure to make and go to all appointments, and call your doctor if you are having problems. It's also a good idea to know your test results and keep a list of the medicines you take.  How can you care for yourself at home?  Make iron-rich foods a part of your daily diet. Iron-rich foods include:  All meats, such as chicken, beef, lamb, pork, fish, and shellfish. Liver is very high in iron.  Leafy green vegetables. Examples are spinach, corbin greens, and Swiss chard.  Raisins, peas, beans, lentils, barley, and eggs.  Iron-fortified grain products. These include breakfast cereals, breads, pastas, and other grain products.  Have foods and drinks that contain vitamin C when you eat iron-rich foods. Vitamin C helps you absorb more iron from food. Foods with vitamin C include tomatoes, bell peppers, broccoli, and citrus fruit or juice.  How much iron do you need?  The recommended daily amount of iron varies. Most people need the following amount of iron each day.  Recommended daily amount of iron from food  Group Age Amount of daily iron   Adults Ages 19 and older  Ages 19 to 50 (who menstruate) 8 mg.  18 mg.   Pregnant Ages 14 to 50 27 mg.   Lactating Ages 14 to 18  Ages 19 to 50 10 mg.  9 mg.   Adolescents Ages 9 to 13  Ages 14 to 18  Ages 14 to 18 (who menstruate) 8 mg.  11 mg.  15 mg.   Children Ages 1 to 3  Ages 4  "to 8 7 mg.  10 mg.   Infants Birth to 6 months  7 to 12 months 0.27 mg.  11 mg.   Where can you learn more?  Go to https://[email or web address] [facility].net/patiented  Enter Z290 in the search box to learn more about \"Iron-Rich Diet: Care Instructions.\"  Current as of: March 1, 2023               Content Version: 13.7    9481-3545 [employer].   Care instructions adapted under license by your healthcare professional. If you have questions about a medical condition or this instruction, always ask your healthcare professional. [employer] disclaims any warranty or liability for your use of this information.           "

## 2023-10-17 ENCOUNTER — MYC MEDICAL ADVICE (OUTPATIENT)
Dept: FAMILY MEDICINE | Facility: CLINIC | Age: 43
End: 2023-10-17
Payer: COMMERCIAL

## 2023-10-22 ENCOUNTER — HEALTH MAINTENANCE LETTER (OUTPATIENT)
Age: 43
End: 2023-10-22

## 2023-12-22 ENCOUNTER — TELEPHONE (OUTPATIENT)
Dept: FAMILY MEDICINE | Facility: CLINIC | Age: 43
End: 2023-12-22
Payer: COMMERCIAL

## 2023-12-22 DIAGNOSIS — Z86.19 FREQUENT INFECTIONS: Primary | ICD-10-CM

## 2023-12-22 DIAGNOSIS — U07.1 INFECTION DUE TO 2019 NOVEL CORONAVIRUS: ICD-10-CM

## 2023-12-22 NOTE — TELEPHONE ENCOUNTER
I've put in lab orders than she can come in next week -- checking her CBC w/ diff, CMP, CRP/ESR (inflammatory markers) just to r/o any reason she's getting so many recurrent infections. I'll follow-up with her after reviewing the labs and we can consider a referral to specialist for recurrent infections (maybe ID or hematology based on labs?).     Thanks,  Kate

## 2023-12-22 NOTE — TELEPHONE ENCOUNTER
[facility] Call Center    Phone Message    May a detailed message be left on voicemail: yes     Reason for Call: Other: Patient called stating she tested positive today for covid. She states this is the 3rd time in the last 4 months and the 5th time since the pandemic. She was told by provider to reach out in regards to this if she was to test positive again. Please contact patient in regards to this message. Writer did look for a virtual visit with provider but nothing was available until 12/29. Patient asked about doing blood work and speaking with provider. Thank you       Action Taken: Other: S    Travel Screening: Not Applicable

## 2023-12-22 NOTE — TELEPHONE ENCOUNTER
Travelled to [place] x 4 days.  Yesterday she developed a sore throat, today congestion and fatigue. Tested today and was positive.    She did have a negative test after infection in October.  12/2021, summer 2022, 8/2023, 10/2023, 12/2023. No antivirals. Declines antivirals at this time.      She would like to know if any follow up is recommended.  Routed to provider

## 2023-12-26 ENCOUNTER — LAB (OUTPATIENT)
Dept: LAB | Facility: CLINIC | Age: 43
End: 2023-12-26
Payer: COMMERCIAL

## 2023-12-26 DIAGNOSIS — U07.1 INFECTION DUE TO 2019 NOVEL CORONAVIRUS: ICD-10-CM

## 2023-12-26 DIAGNOSIS — Z86.19 FREQUENT INFECTIONS: ICD-10-CM

## 2023-12-26 LAB
ALBUMIN SERPL BCG-MCNC: 4 G/DL (ref 3.5–5.2)
ALP SERPL-CCNC: 45 U/L (ref 40–150)
ALT SERPL W P-5'-P-CCNC: 18 U/L (ref 0–50)
ANION GAP SERPL CALCULATED.3IONS-SCNC: 11 MMOL/L (ref 7–15)
AST SERPL W P-5'-P-CCNC: 25 U/L (ref 0–45)
BASOPHILS # BLD AUTO: 0 10E3/UL (ref 0–0.2)
BASOPHILS NFR BLD AUTO: 1 %
BILIRUB SERPL-MCNC: 0.2 MG/DL
BUN SERPL-MCNC: 13.2 MG/DL (ref 6–20)
CALCIUM SERPL-MCNC: 8.7 MG/DL (ref 8.6–10)
CHLORIDE SERPL-SCNC: 107 MMOL/L (ref 98–107)
CREAT SERPL-MCNC: 0.73 MG/DL (ref 0.51–0.95)
CRP SERPL-MCNC: <3 MG/L
DEPRECATED HCO3 PLAS-SCNC: 23 MMOL/L (ref 22–29)
EGFRCR SERPLBLD CKD-EPI 2021: >90 ML/MIN/1.73M2
EOSINOPHIL # BLD AUTO: 0.1 10E3/UL (ref 0–0.7)
EOSINOPHIL NFR BLD AUTO: 2 %
ERYTHROCYTE [DISTWIDTH] IN BLOOD BY AUTOMATED COUNT: 14.8 % (ref 10–15)
ERYTHROCYTE [SEDIMENTATION RATE] IN BLOOD BY WESTERGREN METHOD: 16 MM/HR (ref 0–20)
GLUCOSE SERPL-MCNC: 93 MG/DL (ref 70–99)
HCT VFR BLD AUTO: 37.4 % (ref 35–47)
HGB BLD-MCNC: 12.5 G/DL (ref 11.7–15.7)
IMM GRANULOCYTES # BLD: 0 10E3/UL
IMM GRANULOCYTES NFR BLD: 0 %
LYMPHOCYTES # BLD AUTO: 1.5 10E3/UL (ref 0.8–5.3)
LYMPHOCYTES NFR BLD AUTO: 28 %
MCH RBC QN AUTO: 27 PG (ref 26.5–33)
MCHC RBC AUTO-ENTMCNC: 33.4 G/DL (ref 31.5–36.5)
MCV RBC AUTO: 81 FL (ref 78–100)
MONOCYTES # BLD AUTO: 0.6 10E3/UL (ref 0–1.3)
MONOCYTES NFR BLD AUTO: 11 %
NEUTROPHILS # BLD AUTO: 3.2 10E3/UL (ref 1.6–8.3)
NEUTROPHILS NFR BLD AUTO: 58 %
NRBC # BLD AUTO: 0 10E3/UL
NRBC BLD AUTO-RTO: 0 /100
PLATELET # BLD AUTO: 345 10E3/UL (ref 150–450)
POTASSIUM SERPL-SCNC: 4 MMOL/L (ref 3.4–5.3)
PROT SERPL-MCNC: 6.9 G/DL (ref 6.4–8.3)
RBC # BLD AUTO: 4.63 10E6/UL (ref 3.8–5.2)
SODIUM SERPL-SCNC: 141 MMOL/L (ref 135–145)
WBC # BLD AUTO: 5.4 10E3/UL (ref 4–11)

## 2023-12-26 PROCEDURE — 85025 COMPLETE CBC W/AUTO DIFF WBC: CPT

## 2023-12-26 PROCEDURE — 86140 C-REACTIVE PROTEIN: CPT

## 2023-12-26 PROCEDURE — 80053 COMPREHEN METABOLIC PANEL: CPT

## 2023-12-26 PROCEDURE — 85652 RBC SED RATE AUTOMATED: CPT

## 2023-12-26 PROCEDURE — 36415 COLL VENOUS BLD VENIPUNCTURE: CPT

## 2023-12-27 ENCOUNTER — MYC MEDICAL ADVICE (OUTPATIENT)
Dept: FAMILY MEDICINE | Facility: CLINIC | Age: 43
End: 2023-12-27
Payer: COMMERCIAL

## 2023-12-27 DIAGNOSIS — Z86.19 FREQUENT INFECTIONS: Primary | ICD-10-CM

## 2023-12-27 DIAGNOSIS — A09 TRAVELER'S DIARRHEA: ICD-10-CM

## 2023-12-27 DIAGNOSIS — U07.1 INFECTION DUE TO 2019 NOVEL CORONAVIRUS: ICD-10-CM

## 2023-12-27 RX ORDER — AZITHROMYCIN 500 MG/1
500 TABLET, FILM COATED ORAL DAILY
Qty: 3 TABLET | Refills: 0 | Status: SHIPPED | OUTPATIENT
Start: 2023-12-27 | End: 2023-12-30

## 2024-01-03 ENCOUNTER — OFFICE VISIT (OUTPATIENT)
Dept: INFECTIOUS DISEASES | Facility: CLINIC | Age: 44
End: 2024-01-03
Payer: COMMERCIAL

## 2024-01-03 VITALS
SYSTOLIC BLOOD PRESSURE: 98 MMHG | DIASTOLIC BLOOD PRESSURE: 60 MMHG | HEART RATE: 80 BPM | TEMPERATURE: 98.3 F | WEIGHT: 98.3 LBS | BODY MASS INDEX: 18.27 KG/M2

## 2024-01-03 DIAGNOSIS — U07.1 INFECTION DUE TO 2019 NOVEL CORONAVIRUS: ICD-10-CM

## 2024-01-03 DIAGNOSIS — Z86.19 FREQUENT INFECTIONS: ICD-10-CM

## 2024-01-03 PROCEDURE — 99203 OFFICE O/P NEW LOW 30 MIN: CPT | Performed by: INTERNAL MEDICINE

## 2024-01-03 NOTE — PROGRESS NOTES
[facility] INFECTIOUS DISEASE CLINIC INITIAL CONSULTATION    Date: 1/3/2024  Patient Name: Olesya Hillman   YOB: 1980  MRN: [id number]      ASSESSMENT:  Frequent COVID infection. Unlikely to have significant defect in immune system -- typically those with, for example antibody deficiency, would have more prolonged/severe COVID. Discussed components of the immune system -- humoral, cell-mediated, unlikely to have defects in these based on lack of other recurrent infections. Other subtle immune system weakness not measurable by lab testing -- lack of sleep, stress, etc. And the fact that protecting from respiratory viruses cannot be 100% effective from vaccines. She can proceed with COVID vaccination anytime -- the reason to wait after infection is generally that immune system is boosted anyway from infection, but it is ok to have a repeat vaccination now. Discussed degree of respiratory protection from different types of masks -- surgical low level protection, benefit mainly for those with infection decreasing droplet shedding to others; N95 most protective to the individual wearing it -- if tolerable this is recommended for the higher risk of exposure situations such as travel. Also hand washing, avoiding touching face, as general protective measures.     PLAN:  No further testing  Recommend repeat COVID vaccination  Masking with N95 when in higher risk settings and other measures as above.  Return to clinic as needed.    Tommy Mcconnell MD  [facility]   Clinic phone: [phone]   Clinic fax: [phone]    ______________________________________________________________________    HISTORY OF PRESENT ILLNESS: Olesya Hillman is a 43 year old woman who is referred for evaluation of recurrent COVID infection    Has had COVID 5 times -- 3 times in past 4 months. Vaccinated, boosted but last vaccine was November 2022, had held off on repeating this fall due to  having COVID. Mid August had COVID. Then again mid October, mid December. Symptoms mild to moderate. Gets ill when she travels within 36 hours. Usually masking in airport with surgical mask.     Has used home tests. Tested positive through 10 days of illness. Was well in between episodes, and had negative tests in between.     Travel to [place] in August, [place] Oct, [place] in December.     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 Work on [place]    No known ill contacts each time. Friend she traveled with did not get sick.     Partner lives nearby     Congestion lingers otherwise feels well.       Past Medical History:   Diagnosis Date    ROSLYN (generalized anxiety disorder)     sertraline    Iron deficiency anemia secondary to inadequate dietary iron intake 08/16/2022   Sinus infections >10 years ago, underwent L maxillary sinus surgery, then no sinus infections since.     Past Surgical History:   Procedure Laterality Date    SINUS SURGERY Left     left maxillary sinus       Social History     Socioeconomic History    Marital status: Single     Spouse name: Not on file    Number of children: Not on file    Years of education: Not on file    Highest education level: Not on file   Occupational History    Not on file   Tobacco Use    Smoking status: Never    Smokeless tobacco: Never   Vaping Use    Vaping Use: Never used   Substance and Sexual Activity    Alcohol use: Yes     Alcohol/week: 5.0 standard drinks of alcohol     Types: 5 Glasses of wine per week    Drug use: Never    Sexual activity: Not on file   Other Topics Concern    Not on file   Social History Narrative    Not on file     Social Determinants of Health     Financial Resource Strain: Not on file   Food Insecurity: Not on file   Transportation Needs: Not on file   Physical Activity: Not on file   Stress: Not on file   Social Connections: Not on file   Interpersonal Safety: Not At  Risk (1/10/2023)    Humiliation, Afraid, Rape, and Kick questionnaire     Fear of Current or Ex-Partner: No     Emotionally Abused: No     Physically Abused: No     Sexually Abused: No   Housing Stability: Not on file       Immunization History   Administered Date(s) Administered    COVID-19 Bivalent 12+ (Pfizer) 11/07/2022    COVID-19 Monovalent Booster 18+ (Moderna) 12/06/2021    Influenza Vaccine >6 months,quad, PF 12/06/2021, 10/06/2023    TDAP (Adacel,Boostrix) 02/03/2022      6-7 drinks per week  No tobacco    FH: father passed away from brain cancer  Maternal aunt with gye cancer    ROS: All other systems negative except as listed above.      Current Outpatient Medications:     fluticasone (FLONASE) 50 MCG/ACT nasal spray, Spray 1 spray into both nostrils daily, Disp: , Rfl:     loratadine (CLARITIN) 10 MG tablet, Take 10 mg by mouth, Disp: , Rfl:     Multiple Vitamins-Minerals (WOMENS MULTIVITAMIN PO), , Disp: , Rfl:     sertraline (ZOLOFT) 100 MG tablet, Take 1 tablet (100 mg) by mouth daily, Disp: 90 tablet, Rfl: 4      OBJECTIVE:  BP 98/60 (BP Location: Right arm, Patient Position: Sitting, Cuff Size: Adult Small)   Pulse 80   Temp 98.3  F (36.8  C)   Wt 44.6 kg (98 lb 4.8 oz)   BMI 18.27 kg/m        GEN: No acute distress.  HEENT: conjunctiva clear  RESPIRATORY:  Normal breathing pattern.   CARDIOVASCULAR:  deferred  ABDOMEN:  deferred  Skin: no apparent rash          Pertinent labs:    Reviewed with her

## 2024-01-05 NOTE — PATIENT INSTRUCTIONS
Recommend masking with N95 if possible when traveling -- this is best mask for protection from respiratory viruses. KN95 is a close second.     Remember good hand hygiene when out in public -- hand washing, avoid touching your face, etc.     Recommend COVID vaccination now    Please let me know if you have other questions or concerns    Tommy Mcconnell MD

## 2024-12-14 ENCOUNTER — HEALTH MAINTENANCE LETTER (OUTPATIENT)
Age: 44
End: 2024-12-14

## 2025-08-31 ENCOUNTER — HEALTH MAINTENANCE LETTER (OUTPATIENT)
Age: 45
End: 2025-08-31